# Patient Record
Sex: MALE | Race: WHITE | NOT HISPANIC OR LATINO | URBAN - NONMETROPOLITAN AREA
[De-identification: names, ages, dates, MRNs, and addresses within clinical notes are randomized per-mention and may not be internally consistent; named-entity substitution may affect disease eponyms.]

---

## 2017-10-30 ENCOUNTER — FOLLOW UP ESTABLISHED (OUTPATIENT)
Dept: URBAN - NONMETROPOLITAN AREA CLINIC 3 | Facility: CLINIC | Age: 72
End: 2017-10-30
Payer: COMMERCIAL

## 2017-10-30 DIAGNOSIS — H52.4 PRESBYOPIA: ICD-10-CM

## 2017-10-30 PROCEDURE — 92014 COMPRE OPH EXAM EST PT 1/>: CPT | Performed by: OPTOMETRIST

## 2017-10-30 PROCEDURE — 92015 DETERMINE REFRACTIVE STATE: CPT | Performed by: OPTOMETRIST

## 2017-10-30 PROCEDURE — 92134 CPTRZ OPH DX IMG PST SGM RTA: CPT | Performed by: OPTOMETRIST

## 2017-10-30 ASSESSMENT — INTRAOCULAR PRESSURE
OD: 17
OS: 18

## 2017-10-30 ASSESSMENT — VISUAL ACUITY
OD: 20/30
OS: 20/30

## 2018-04-18 ENCOUNTER — FOLLOW UP ESTABLISHED (OUTPATIENT)
Dept: URBAN - NONMETROPOLITAN AREA CLINIC 3 | Facility: CLINIC | Age: 73
End: 2018-04-18
Payer: COMMERCIAL

## 2018-04-18 DIAGNOSIS — E11.36 TYPE 2 DIABETES MELLITUS WITH DIABETIC CATARACT: Primary | ICD-10-CM

## 2018-04-18 DIAGNOSIS — Z79.4 LONG TERM (CURRENT) USE OF INSULIN: ICD-10-CM

## 2018-04-18 PROCEDURE — 92014 COMPRE OPH EXAM EST PT 1/>: CPT | Performed by: OPTOMETRIST

## 2018-04-18 PROCEDURE — 92015 DETERMINE REFRACTIVE STATE: CPT | Performed by: OPTOMETRIST

## 2018-04-18 ASSESSMENT — INTRAOCULAR PRESSURE
OD: 18
OS: 17

## 2018-04-18 ASSESSMENT — VISUAL ACUITY
OS: 20/30
OD: 20/40

## 2018-10-23 ENCOUNTER — FOLLOW UP ESTABLISHED (OUTPATIENT)
Dept: URBAN - NONMETROPOLITAN AREA CLINIC 3 | Facility: CLINIC | Age: 73
End: 2018-10-23
Payer: COMMERCIAL

## 2018-10-23 PROCEDURE — 92014 COMPRE OPH EXAM EST PT 1/>: CPT | Performed by: OPTOMETRIST

## 2018-10-23 ASSESSMENT — VISUAL ACUITY
OS: 20/25
OD: 20/30

## 2018-10-23 ASSESSMENT — INTRAOCULAR PRESSURE
OD: 18
OS: 17
OD: 13
OS: 14

## 2018-10-23 ASSESSMENT — KERATOMETRY
OD: 45.00
OS: 44.50

## 2019-02-01 ENCOUNTER — FOLLOW UP ESTABLISHED (OUTPATIENT)
Dept: URBAN - METROPOLITAN AREA CLINIC 24 | Facility: CLINIC | Age: 74
End: 2019-02-01
Payer: MEDICARE

## 2019-02-01 PROCEDURE — 92002 INTRM OPH EXAM NEW PATIENT: CPT | Performed by: OPHTHALMOLOGY

## 2019-02-01 PROCEDURE — 92134 CPTRZ OPH DX IMG PST SGM RTA: CPT | Performed by: OPHTHALMOLOGY

## 2019-02-01 PROCEDURE — 92020 GONIOSCOPY: CPT | Performed by: OPHTHALMOLOGY

## 2019-02-01 PROCEDURE — 76514 ECHO EXAM OF EYE THICKNESS: CPT | Performed by: OPHTHALMOLOGY

## 2019-02-01 PROCEDURE — 92083 EXTENDED VISUAL FIELD XM: CPT | Performed by: OPHTHALMOLOGY

## 2019-02-01 RX ORDER — PREDNISOLONE ACETATE 10 MG/ML
1 % SUSPENSION/ DROPS OPHTHALMIC
Qty: 1 | Refills: 1 | Status: INACTIVE
Start: 2019-02-01 | End: 2021-02-26

## 2019-02-01 ASSESSMENT — VISUAL ACUITY
OD: 20/50
OS: 20/40

## 2019-02-01 ASSESSMENT — INTRAOCULAR PRESSURE
OD: 24
OS: 24

## 2019-05-22 ENCOUNTER — FOLLOW UP ESTABLISHED (OUTPATIENT)
Dept: URBAN - NONMETROPOLITAN AREA CLINIC 3 | Facility: CLINIC | Age: 74
End: 2019-05-22
Payer: COMMERCIAL

## 2019-05-22 PROCEDURE — 92012 INTRM OPH EXAM EST PATIENT: CPT | Performed by: OPTOMETRIST

## 2019-05-22 ASSESSMENT — VISUAL ACUITY
OS: 20/30
OD: 20/30

## 2019-05-22 ASSESSMENT — KERATOMETRY
OD: 44.88
OS: 44.75

## 2019-05-22 ASSESSMENT — INTRAOCULAR PRESSURE
OS: 15
OD: 17

## 2020-09-22 ENCOUNTER — FOLLOW UP ESTABLISHED (OUTPATIENT)
Dept: URBAN - NONMETROPOLITAN AREA CLINIC 3 | Facility: CLINIC | Age: 75
End: 2020-09-22
Payer: COMMERCIAL

## 2020-09-22 DIAGNOSIS — H43.811 VITREOUS DEGENERATION, RIGHT EYE: ICD-10-CM

## 2020-09-22 DIAGNOSIS — E11.3312 DIABETES MELLITUS TYPE 2 WITH MODERATE NON-PROLIFE: Primary | ICD-10-CM

## 2020-09-22 PROCEDURE — 92014 COMPRE OPH EXAM EST PT 1/>: CPT | Performed by: OPTOMETRIST

## 2020-09-22 PROCEDURE — 92134 CPTRZ OPH DX IMG PST SGM RTA: CPT | Performed by: OPTOMETRIST

## 2020-09-22 ASSESSMENT — INTRAOCULAR PRESSURE
OD: 14
OS: 14

## 2020-09-22 ASSESSMENT — VISUAL ACUITY
OS: 20/40
OD: 20/40

## 2020-10-14 ENCOUNTER — FOLLOW UP ESTABLISHED (OUTPATIENT)
Dept: URBAN - NONMETROPOLITAN AREA CLINIC 6 | Facility: CLINIC | Age: 75
End: 2020-10-14
Payer: MEDICARE

## 2020-10-14 DIAGNOSIS — H40.033 LONG TERM (CURRENT) USE OF INSULIN: ICD-10-CM

## 2020-10-14 DIAGNOSIS — E11.3291 DIABETES MELLITUS TYPE 2 WITH MILD NON-PROLIFERATI: ICD-10-CM

## 2020-10-14 DIAGNOSIS — E11.3212 DIABETES MELLITUS TYPE 2 WITH MILD NON-PROLIFERATI: Primary | ICD-10-CM

## 2020-10-14 PROCEDURE — 92235 FLUORESCEIN ANGRPH MLTIFRAME: CPT | Performed by: OPHTHALMOLOGY

## 2020-10-14 PROCEDURE — 92014 COMPRE OPH EXAM EST PT 1/>: CPT | Performed by: OPHTHALMOLOGY

## 2020-10-14 PROCEDURE — 92134 CPTRZ OPH DX IMG PST SGM RTA: CPT | Performed by: OPHTHALMOLOGY

## 2020-10-14 ASSESSMENT — INTRAOCULAR PRESSURE
OD: 13
OS: 10

## 2021-02-26 ENCOUNTER — FOLLOW UP ESTABLISHED (OUTPATIENT)
Dept: URBAN - METROPOLITAN AREA CLINIC 24 | Facility: CLINIC | Age: 76
End: 2021-02-26
Payer: COMMERCIAL

## 2021-02-26 PROCEDURE — 99214 OFFICE O/P EST MOD 30 MIN: CPT | Performed by: OPHTHALMOLOGY

## 2021-02-26 RX ORDER — PREDNISOLONE ACETATE 10 MG/ML
1 % SUSPENSION/ DROPS OPHTHALMIC
Qty: 5 | Refills: 1 | Status: INACTIVE
Start: 2021-02-26 | End: 2021-06-14

## 2021-02-26 ASSESSMENT — INTRAOCULAR PRESSURE
OS: 20
OD: 20

## 2021-03-19 ENCOUNTER — ADULT PHYSICAL (OUTPATIENT)
Dept: URBAN - METROPOLITAN AREA CLINIC 24 | Facility: CLINIC | Age: 76
End: 2021-03-19
Payer: COMMERCIAL

## 2021-03-19 DIAGNOSIS — Z01.818 ENCOUNTER FOR OTHER PREPROCEDURAL EXAMINATION: Primary | ICD-10-CM

## 2021-03-19 PROCEDURE — 99203 OFFICE O/P NEW LOW 30 MIN: CPT | Performed by: PHYSICIAN ASSISTANT

## 2021-03-24 ENCOUNTER — SURGERY (OUTPATIENT)
Dept: URBAN - METROPOLITAN AREA SURGERY 12 | Facility: SURGERY | Age: 76
End: 2021-03-24
Payer: COMMERCIAL

## 2021-03-24 PROCEDURE — 66761 REVISION OF IRIS: CPT | Performed by: OPHTHALMOLOGY

## 2021-04-07 ENCOUNTER — SURGERY (OUTPATIENT)
Dept: URBAN - METROPOLITAN AREA SURGERY 12 | Facility: SURGERY | Age: 76
End: 2021-04-07
Payer: COMMERCIAL

## 2021-04-07 PROCEDURE — 66761 REVISION OF IRIS: CPT | Performed by: OPHTHALMOLOGY

## 2021-04-26 ENCOUNTER — POST-OPERATIVE VISIT (OUTPATIENT)
Dept: URBAN - NONMETROPOLITAN AREA CLINIC 3 | Facility: CLINIC | Age: 76
End: 2021-04-26
Payer: COMMERCIAL

## 2021-04-26 DIAGNOSIS — Z48.810 ENCOUNTER FOR SURGICAL AFTERCARE FOLLOWING SURGERY ON A SENSE ORGAN: Primary | ICD-10-CM

## 2021-04-26 PROCEDURE — 99024 POSTOP FOLLOW-UP VISIT: CPT | Performed by: OPTOMETRIST

## 2021-04-26 ASSESSMENT — INTRAOCULAR PRESSURE
OS: 20
OD: 19

## 2021-06-03 ENCOUNTER — OFFICE VISIT (OUTPATIENT)
Dept: URBAN - NONMETROPOLITAN AREA CLINIC 3 | Facility: CLINIC | Age: 76
End: 2021-06-03
Payer: COMMERCIAL

## 2021-06-03 PROCEDURE — 99212 OFFICE O/P EST SF 10 MIN: CPT | Performed by: OPTOMETRIST

## 2021-06-03 ASSESSMENT — INTRAOCULAR PRESSURE
OS: 18
OD: 17

## 2021-06-03 NOTE — IMPRESSION/PLAN
Impression: Facial pain NOS: R51.9. Plan: PT reports interim  right sided scalp an d facial pian, NO scalp tenderness note on palpation today. Anatomically  narrow anterior chamber angles with  patent LKPI. IOP WNL/OU Discussed findings, Advised  pt to present  to PCP for consideration of ESR C-RP  for evaluation  of Giant Cell arteritis.

## 2021-06-14 ENCOUNTER — OFFICE VISIT (OUTPATIENT)
Dept: URBAN - NONMETROPOLITAN AREA CLINIC 3 | Facility: CLINIC | Age: 76
End: 2021-06-14
Payer: COMMERCIAL

## 2021-06-14 DIAGNOSIS — R51.9: Primary | ICD-10-CM

## 2021-06-14 PROCEDURE — 99212 OFFICE O/P EST SF 10 MIN: CPT | Performed by: OPTOMETRIST

## 2021-06-14 ASSESSMENT — INTRAOCULAR PRESSURE
OD: 20
OS: 22

## 2021-06-14 NOTE — IMPRESSION/PLAN
Impression: Facial pain NOS: R51.9. Plan: PT reports N O pain today,  NO scalp tenderness note on palpation today. Anatomically  narrow anterior chamber angles with  patent LKPI. IOP WNL/OU Discussed findings . Advised to return  to PCP to review LAB  RESULTS. Keep  all appts as scheduled.

## 2021-07-26 ENCOUNTER — OFFICE VISIT (OUTPATIENT)
Dept: URBAN - NONMETROPOLITAN AREA CLINIC 3 | Facility: CLINIC | Age: 76
End: 2021-07-26
Payer: COMMERCIAL

## 2021-07-26 DIAGNOSIS — H25.813 COMBINED FORMS OF AGE-RELATED CATARACT, BILATERAL: Primary | ICD-10-CM

## 2021-07-26 DIAGNOSIS — E11.39 TYPE 2 DIABETES MELLITUS WITH OTHER DIABETIC OPHTHALMIC COMPLICATION: ICD-10-CM

## 2021-07-26 PROCEDURE — 92134 CPTRZ OPH DX IMG PST SGM RTA: CPT | Performed by: OPTOMETRIST

## 2021-07-26 PROCEDURE — 99213 OFFICE O/P EST LOW 20 MIN: CPT | Performed by: OPTOMETRIST

## 2021-07-26 ASSESSMENT — INTRAOCULAR PRESSURE
OD: 18
OS: 18

## 2021-07-26 ASSESSMENT — KERATOMETRY
OS: 44.63
OD: 45.25

## 2021-07-26 ASSESSMENT — VISUAL ACUITY
OD: 20/50
OS: 20/60

## 2021-07-26 NOTE — IMPRESSION/PLAN
Impression: Combined forms of age-related cataract, bilateral Plan: Cataracts account for the patient's complaints. Discussed all risks, benefits, procedures and recovery. Patient understands changing glasses will not improve vision. Patient desires to have surgery, recommend phacoemulsification with intraocular lens. OD, OS. Advised retinal clearance BEFORE surgery due to edema.

## 2021-07-26 NOTE — IMPRESSION/PLAN
Impression: Type 2 diabetes mellitus with mild nonproliferative diabetic retinopathy with macular edema, left eye: E11.3212. Plan: Diabetes type II: mild background diabetic retinopathy/DME OS, no signs of neovascularization noted. No treatment necessary at this time. Patient was instructed to monitor vision for sudden changes and to call if visual changes noted. Discussed ocular and systemic benefits of blood sugar control.

## 2021-07-26 NOTE — IMPRESSION/PLAN
Impression: Long term (current) use of insulin Plan: Advised patient to comply with PCP/Endocrinologist

## 2021-07-26 NOTE — IMPRESSION/PLAN
Impression: Type 2 diabetes mellitus with other diabetic ophthalmic complication: A66.52. Plan: Diabetes type II: mild background diabetic retinopathy/DME OS, no signs of neovascularization noted. No treatment necessary at this time. Patient was instructed to monitor vision for sudden changes and to call if visual changes noted. Discussed ocular and systemic benefits of blood sugar control.

## 2021-08-17 ENCOUNTER — OFFICE VISIT (OUTPATIENT)
Dept: URBAN - METROPOLITAN AREA CLINIC 24 | Facility: CLINIC | Age: 76
End: 2021-08-17
Payer: COMMERCIAL

## 2021-08-17 PROCEDURE — 92235 FLUORESCEIN ANGRPH MLTIFRAME: CPT | Performed by: OPHTHALMOLOGY

## 2021-08-17 PROCEDURE — 92134 CPTRZ OPH DX IMG PST SGM RTA: CPT | Performed by: OPHTHALMOLOGY

## 2021-08-17 PROCEDURE — 92014 COMPRE OPH EXAM EST PT 1/>: CPT | Performed by: OPHTHALMOLOGY

## 2021-08-17 NOTE — IMPRESSION/PLAN
Impression: Combined forms of age-related cataract, bilateral Plan: May proceed with CE/IOL OU - understands that diabetic retinopathy may limit BCVA OU. Understands that DME OS may worsen after surgery.  Observe closely

## 2021-08-17 NOTE — IMPRESSION/PLAN
Impression: Diabetes mellitus Type 2 with mild non-proliferative retinopathy with macular edema, left eye Plan: Minimal DME OS - asymptomatic. Observe closely with glucose control.  Review in 2 months, sooner PRN

## 2021-08-17 NOTE — IMPRESSION/PLAN
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